# Patient Record
Sex: MALE
[De-identification: names, ages, dates, MRNs, and addresses within clinical notes are randomized per-mention and may not be internally consistent; named-entity substitution may affect disease eponyms.]

---

## 2020-06-08 ENCOUNTER — TRANSCRIPTION ENCOUNTER (OUTPATIENT)
Age: 40
End: 2020-06-08

## 2022-04-05 PROBLEM — Z00.00 ENCOUNTER FOR PREVENTIVE HEALTH EXAMINATION: Status: ACTIVE | Noted: 2022-04-05

## 2022-04-12 ENCOUNTER — APPOINTMENT (OUTPATIENT)
Dept: UROLOGY | Facility: CLINIC | Age: 42
End: 2022-04-12
Payer: COMMERCIAL

## 2022-04-12 VITALS
HEIGHT: 67 IN | TEMPERATURE: 98.4 F | DIASTOLIC BLOOD PRESSURE: 81 MMHG | OXYGEN SATURATION: 97 % | HEART RATE: 66 BPM | WEIGHT: 175 LBS | BODY MASS INDEX: 27.47 KG/M2 | SYSTOLIC BLOOD PRESSURE: 123 MMHG

## 2022-04-12 DIAGNOSIS — Z78.9 OTHER SPECIFIED HEALTH STATUS: ICD-10-CM

## 2022-04-12 PROCEDURE — 99202 OFFICE O/P NEW SF 15 MIN: CPT

## 2022-04-12 RX ORDER — MULTIVIT-MIN/IRON FUM/FOLIC AC 7.5 MG-4
TABLET ORAL
Refills: 0 | Status: ACTIVE | COMMUNITY

## 2022-04-13 ENCOUNTER — TRANSCRIPTION ENCOUNTER (OUTPATIENT)
Age: 42
End: 2022-04-13

## 2022-04-15 ENCOUNTER — APPOINTMENT (OUTPATIENT)
Dept: UROLOGY | Facility: CLINIC | Age: 42
End: 2022-04-15
Payer: COMMERCIAL

## 2022-04-15 ENCOUNTER — RESULT CHARGE (OUTPATIENT)
Age: 42
End: 2022-04-15

## 2022-04-15 VITALS
HEART RATE: 78 BPM | OXYGEN SATURATION: 97 % | DIASTOLIC BLOOD PRESSURE: 86 MMHG | SYSTOLIC BLOOD PRESSURE: 132 MMHG | TEMPERATURE: 98.6 F

## 2022-04-15 DIAGNOSIS — R39.11 HESITANCY OF MICTURITION: ICD-10-CM

## 2022-04-15 DIAGNOSIS — Z30.2 ENCOUNTER FOR STERILIZATION: ICD-10-CM

## 2022-04-15 DIAGNOSIS — R39.9 UNSPECIFIED SYMPTOMS AND SIGNS INVOLVING THE GENITOURINARY SYSTEM: ICD-10-CM

## 2022-04-15 PROCEDURE — 51741 ELECTRO-UROFLOWMETRY FIRST: CPT

## 2022-04-15 PROCEDURE — 99214 OFFICE O/P EST MOD 30 MIN: CPT

## 2022-04-15 PROCEDURE — 51798 US URINE CAPACITY MEASURE: CPT

## 2022-04-15 PROCEDURE — 76775 US EXAM ABDO BACK WALL LIM: CPT

## 2022-04-15 RX ORDER — CEFDINIR 300 MG/1
300 CAPSULE ORAL DAILY
Qty: 6 | Refills: 0 | Status: ACTIVE | COMMUNITY
Start: 2022-04-15 | End: 1900-01-01

## 2022-04-15 NOTE — PHYSICAL EXAM
[General Appearance - Well Developed] : well developed [General Appearance - Well Nourished] : well nourished [Normal Appearance] : normal appearance [Well Groomed] : well groomed [General Appearance - In No Acute Distress] : no acute distress [Edema] : no peripheral edema [Respiration, Rhythm And Depth] : normal respiratory rhythm and effort [Exaggerated Use Of Accessory Muscles For Inspiration] : no accessory muscle use [Abdomen Soft] : soft [Abdomen Tenderness] : non-tender [Costovertebral Angle Tenderness] : no ~M costovertebral angle tenderness [Urethral Meatus] : meatus normal [Urinary Bladder Findings] : the bladder was normal on palpation [Scrotum] : the scrotum was normal [Testes Mass (___cm)] : there were no testicular masses [No Prostate Nodules] : no prostate nodules [Prostate Size ___ (0-4)] : prostate size [unfilled] (scale: 0-4) [Normal Station and Gait] : the gait and station were normal for the patient's age [] : no rash [No Focal Deficits] : no focal deficits [Oriented To Time, Place, And Person] : oriented to person, place, and time [Affect] : the affect was normal [Mood] : the mood was normal [Not Anxious] : not anxious [No Palpable Adenopathy] : no palpable adenopathy [FreeTextEntry1] : Regular prostate, not painful, regular consistency, no nudules.

## 2022-04-15 NOTE — HISTORY OF PRESENT ILLNESS
[FreeTextEntry1] : Patient reports weak uroflow in the morning up to 3 weeks ago. \par No family history of prostate cancer. No flank pain, no emptying issues in the last 3 weeks.\par Previous PSA reported as normal (not shown)

## 2022-04-15 NOTE — PHYSICAL EXAM
[General Appearance - Well Developed] : well developed [General Appearance - Well Nourished] : well nourished [Normal Appearance] : normal appearance [Well Groomed] : well groomed [General Appearance - In No Acute Distress] : no acute distress [Abdomen Soft] : soft [Abdomen Tenderness] : non-tender [Costovertebral Angle Tenderness] : no ~M costovertebral angle tenderness [Urethral Meatus] : meatus normal [Penis Abnormality] : normal circumcised penis [Scrotum] : the scrotum was normal [Epididymis] : the epididymides were normal [Testes Tenderness] : no tenderness of the testes [Testes Mass (___cm)] : there were no testicular masses

## 2022-04-15 NOTE — ASSESSMENT
[FreeTextEntry1] : The patient is not reporting any voiding issue today. \par IPSS score: 4 (mild)\par PSA check done before the visit. \par Even if the symptoms seems to have spontaneously solved, useful follow up in 1 month with uroflow. \par Suggested to check for dietary changes in the last month which may be connected with the reported symptoms.\par Useful follow up in 1 month after uroflow to check results and eventually plan further exams.\par

## 2022-04-23 PROBLEM — R39.9 LOWER URINARY TRACT SYMPTOMS (LUTS): Status: ACTIVE | Noted: 2022-04-23

## 2022-04-23 PROBLEM — R39.11 URINARY HESITANCY: Status: ACTIVE | Noted: 2022-04-12

## 2022-04-23 NOTE — ASSESSMENT
[FreeTextEntry1] : 41 year old male is here for a vasectomy consult.\par Patient to schedule vasectomy\par Rationale, benefit, risk, and alternatives reviewed. All questions were answered. Pt understood. \par \par LUTS- symptoms have resolved.\par PVR: 0\par renal sono nl\par \par Follow up for vasectomy- cefdinir ordered.\par \par \par

## 2022-04-23 NOTE — LETTER BODY
[Dear  ___] : Dear  [unfilled], [Consult Letter:] : I had the pleasure of evaluating your patient, [unfilled]. [Please see my note below.] : Please see my note below. [Consult Closing:] : Thank you very much for allowing me to participate in the care of this patient.  If you have any questions, please do not hesitate to contact me. [Sincerely,] : Sincerely, [FreeTextEntry1] : see below\par \par to arrange vasectomy [FreeTextEntry3] : Branden Long MD FACS\par \par Attending Urologist-[facility]\par Chief-Urology Division [facility]\par Associate Clinical Professor-[employer] at [facility]\par \par

## 2022-04-23 NOTE — END OF VISIT
[FreeTextEntry3] : Medical record entries made by the scribe today, were at my direction and personally dictated to them by me, Dr. Branden Long on 04/15/2022. I have reviewed the chart and agree that the record accurately reflects my personal performance of the history, physical exam, assessment, and plan.

## 2022-04-23 NOTE — HISTORY OF PRESENT ILLNESS
[FreeTextEntry1] : 41 year old male is here for an initial consultation for a vasectomy.\par Pt has 2 children (3, 5 years old) and he does not want any more.\par No other complaints \par \par PSA 3/23/22: 0.61\par \par also, LUTS\par incomplete emptying 3 weeks ago- felt like he had to push or return to bathroom 10 mins later\par symptoms have resolved since then\par was to undergo flow and reanl sono as well

## 2022-05-08 LAB
BILIRUB UR QL STRIP: NEGATIVE
CLARITY UR: CLEAR
COLLECTION METHOD: NORMAL
GLUCOSE UR-MCNC: NEGATIVE
HCG UR QL: 0.2 EU/DL
HGB UR QL STRIP.AUTO: NEGATIVE
KETONES UR-MCNC: NEGATIVE
LEUKOCYTE ESTERASE UR QL STRIP: NEGATIVE
NITRITE UR QL STRIP: NEGATIVE
PH UR STRIP: 6
PROT UR STRIP-MCNC: NEGATIVE
SP GR UR STRIP: 1.03

## 2024-01-19 ENCOUNTER — NON-APPOINTMENT (OUTPATIENT)
Age: 44
End: 2024-01-19